# Patient Record
(demographics unavailable — no encounter records)

---

## 2024-11-19 NOTE — REVIEW OF SYSTEMS
[Feeling Fatigued] : feeling fatigued [Weight Loss (___ Lbs)] : [unfilled] ~Ulb weight loss [Vertigo] : vertigo [Joint Pain] : joint pain [Dizziness] : dizziness [Tremor] : a tremor was seen [Depression] : depression [Fever] : no fever [Headache] : no headache [Chills] : no chills [Earache] : no earache [Sore Throat] : no sore throat [SOB] : no shortness of breath [Dyspnea on exertion] : not dyspnea during exertion [Chest Discomfort] : no chest discomfort [Palpitations] : no palpitations [Syncope] : no syncope [Cough] : no cough [Abdominal Pain] : no abdominal pain [Nausea] : no nausea [Vomiting] : no vomiting [Diarrhea] : diarrhea [Easy Bleeding] : no tendency for easy bleeding

## 2024-11-19 NOTE — PHYSICAL EXAM
[Frail] : frail [Ill-Appearing] : ill-appearing [Cachectic] : cachexia was observed [Normal S1, S2] : normal S1, S2 [No Murmur] : no murmur [Clear Lung Fields] : clear lung fields [Good Air Entry] : good air entry [Soft] : abdomen soft [Non Tender] : non-tender [Moves all extremities] : moves all extremities [No Focal Deficits] : no focal deficits [Normal Speech] : normal speech [Alert and Oriented] : alert and oriented [Normal memory] : normal memory [de-identified] : urostomy pouch noted but drained and bilateral nephrostomy tubes

## 2024-11-19 NOTE — DISCUSSION/SUMMARY
[FreeTextEntry1] : 86 year old male with PMHX of anemia and bladder ca, currently with urostomy pouch  Patient has had multiple recent admissions for metastatic bladder cancer with radiation and has had many side effects including obstruction of both kidneys and now has bilateral nephrostomy tubes patient has had episodes of sepsis patient has abdominal pain patient is on a cocktail of painkillers Tylenol Dilaudid and marijuana products has seen palliative care  I have discussed with Dr. Troy if his creatinine goes below 2 they will restart chemotherapy for his metastatic disease  Patient's ejection fraction is normal.  Patient has a normal EKG patient has no significant valvular lesions [EKG obtained to assist in diagnosis and management of assessed problem(s)] : EKG obtained to assist in diagnosis and management of assessed problem(s)

## 2024-11-19 NOTE — HISTORY OF PRESENT ILLNESS
[FreeTextEntry1] : 84 year old male with PMHX of anemia and bladder ca, currently with urostomy pouch here for evaluation.   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